# Patient Record
Sex: MALE | Race: WHITE | URBAN - METROPOLITAN AREA
[De-identification: names, ages, dates, MRNs, and addresses within clinical notes are randomized per-mention and may not be internally consistent; named-entity substitution may affect disease eponyms.]

---

## 2018-07-18 ENCOUNTER — TELEPHONE (OUTPATIENT)
Dept: TRANSPLANT | Facility: CLINIC | Age: 68
End: 2018-07-18

## 2018-07-18 NOTE — TELEPHONE ENCOUNTER
Kidney referral;  Would like to transfer his care from [place] to the [facility].  Also, has potential donor who is his niece.

## 2018-09-24 ENCOUNTER — TELEPHONE (OUTPATIENT)
Dept: TRANSPLANT | Facility: CLINIC | Age: 68
End: 2018-09-24

## 2018-09-24 NOTE — TELEPHONE ENCOUNTER
Call to Stephen.  No living donor will work out at [facility] program so he desires to stay with his active listing at [facility].  I informed him I would close/resolve his case here.